# Patient Record
Sex: FEMALE | Race: WHITE | NOT HISPANIC OR LATINO | Employment: FULL TIME | URBAN - METROPOLITAN AREA
[De-identification: names, ages, dates, MRNs, and addresses within clinical notes are randomized per-mention and may not be internally consistent; named-entity substitution may affect disease eponyms.]

---

## 2024-01-12 ENCOUNTER — APPOINTMENT (OUTPATIENT)
Dept: PRIMARY CARE | Facility: CLINIC | Age: 29
End: 2024-01-12
Payer: COMMERCIAL

## 2024-02-16 ENCOUNTER — APPOINTMENT (OUTPATIENT)
Dept: OBSTETRICS AND GYNECOLOGY | Facility: CLINIC | Age: 29
End: 2024-02-16
Payer: COMMERCIAL

## 2024-02-16 ENCOUNTER — OFFICE VISIT (OUTPATIENT)
Dept: OBSTETRICS AND GYNECOLOGY | Facility: CLINIC | Age: 29
End: 2024-02-16
Payer: COMMERCIAL

## 2024-02-16 ENCOUNTER — LAB (OUTPATIENT)
Dept: LAB | Facility: LAB | Age: 29
End: 2024-02-16
Payer: COMMERCIAL

## 2024-02-16 VITALS
WEIGHT: 210 LBS | DIASTOLIC BLOOD PRESSURE: 70 MMHG | BODY MASS INDEX: 35.85 KG/M2 | SYSTOLIC BLOOD PRESSURE: 118 MMHG | HEIGHT: 64 IN

## 2024-02-16 DIAGNOSIS — Z11.3 ROUTINE SCREENING FOR STI (SEXUALLY TRANSMITTED INFECTION): ICD-10-CM

## 2024-02-16 DIAGNOSIS — Z12.4 SCREENING FOR CERVICAL CANCER: ICD-10-CM

## 2024-02-16 DIAGNOSIS — Z01.419 ENCOUNTER FOR ANNUAL ROUTINE GYNECOLOGICAL EXAMINATION: Primary | ICD-10-CM

## 2024-02-16 LAB
HBV SURFACE AG SERPL QL IA: NONREACTIVE
HCV AB SER QL: NONREACTIVE
HIV 1+2 AB+HIV1 P24 AG SERPL QL IA: NONREACTIVE
TREPONEMA PALLIDUM IGG+IGM AB [PRESENCE] IN SERUM OR PLASMA BY IMMUNOASSAY: NONREACTIVE

## 2024-02-16 PROCEDURE — 87661 TRICHOMONAS VAGINALIS AMPLIF: CPT

## 2024-02-16 PROCEDURE — 86803 HEPATITIS C AB TEST: CPT

## 2024-02-16 PROCEDURE — 88175 CYTOPATH C/V AUTO FLUID REDO: CPT

## 2024-02-16 PROCEDURE — 36415 COLL VENOUS BLD VENIPUNCTURE: CPT

## 2024-02-16 PROCEDURE — 87389 HIV-1 AG W/HIV-1&-2 AB AG IA: CPT

## 2024-02-16 PROCEDURE — 99385 PREV VISIT NEW AGE 18-39: CPT | Performed by: OBSTETRICS & GYNECOLOGY

## 2024-02-16 PROCEDURE — 87340 HEPATITIS B SURFACE AG IA: CPT

## 2024-02-16 PROCEDURE — 87800 DETECT AGNT MULT DNA DIREC: CPT

## 2024-02-16 PROCEDURE — 86780 TREPONEMA PALLIDUM: CPT

## 2024-02-20 LAB
C TRACH RRNA SPEC QL NAA+PROBE: NEGATIVE
N GONORRHOEA DNA SPEC QL PROBE+SIG AMP: NEGATIVE
T VAGINALIS RRNA SPEC QL NAA+PROBE: NEGATIVE

## 2024-02-23 ENCOUNTER — OFFICE VISIT (OUTPATIENT)
Dept: PRIMARY CARE | Facility: CLINIC | Age: 29
End: 2024-02-23
Payer: COMMERCIAL

## 2024-02-23 ENCOUNTER — LAB (OUTPATIENT)
Dept: LAB | Facility: LAB | Age: 29
End: 2024-02-23
Payer: COMMERCIAL

## 2024-02-23 VITALS
HEART RATE: 64 BPM | SYSTOLIC BLOOD PRESSURE: 114 MMHG | WEIGHT: 207.5 LBS | BODY MASS INDEX: 35.42 KG/M2 | HEIGHT: 64 IN | DIASTOLIC BLOOD PRESSURE: 75 MMHG | RESPIRATION RATE: 14 BRPM | OXYGEN SATURATION: 98 % | TEMPERATURE: 98.7 F

## 2024-02-23 DIAGNOSIS — Z00.00 ROUTINE HEALTH MAINTENANCE: ICD-10-CM

## 2024-02-23 DIAGNOSIS — K58.0 IRRITABLE BOWEL SYNDROME WITH DIARRHEA: ICD-10-CM

## 2024-02-23 DIAGNOSIS — E66.09 CLASS 2 OBESITY DUE TO EXCESS CALORIES WITHOUT SERIOUS COMORBIDITY WITH BODY MASS INDEX (BMI) OF 35.0 TO 35.9 IN ADULT: ICD-10-CM

## 2024-02-23 DIAGNOSIS — R05.3 CHRONIC COUGH: Primary | ICD-10-CM

## 2024-02-23 DIAGNOSIS — F41.9 ANXIETY: ICD-10-CM

## 2024-02-23 PROBLEM — F32.A ANXIETY AND DEPRESSION: Status: RESOLVED | Noted: 2024-02-23 | Resolved: 2024-02-23

## 2024-02-23 PROCEDURE — 86003 ALLG SPEC IGE CRUDE XTRC EA: CPT

## 2024-02-23 PROCEDURE — 80053 COMPREHEN METABOLIC PANEL: CPT

## 2024-02-23 PROCEDURE — 99214 OFFICE O/P EST MOD 30 MIN: CPT | Performed by: FAMILY MEDICINE

## 2024-02-23 PROCEDURE — 36415 COLL VENOUS BLD VENIPUNCTURE: CPT

## 2024-02-23 PROCEDURE — 83036 HEMOGLOBIN GLYCOSYLATED A1C: CPT

## 2024-02-23 PROCEDURE — 80061 LIPID PANEL: CPT

## 2024-02-23 PROCEDURE — 1036F TOBACCO NON-USER: CPT | Performed by: FAMILY MEDICINE

## 2024-02-23 PROCEDURE — 3008F BODY MASS INDEX DOCD: CPT | Performed by: FAMILY MEDICINE

## 2024-02-23 PROCEDURE — 85027 COMPLETE CBC AUTOMATED: CPT

## 2024-02-23 PROCEDURE — 84443 ASSAY THYROID STIM HORMONE: CPT

## 2024-02-23 RX ORDER — IPRATROPIUM BROMIDE 21 UG/1
2 SPRAY, METERED NASAL EVERY 12 HOURS
Qty: 30 ML | Refills: 12 | Status: SHIPPED | OUTPATIENT
Start: 2024-02-23 | End: 2024-04-05 | Stop reason: WASHOUT

## 2024-02-23 RX ORDER — SERTRALINE HYDROCHLORIDE 25 MG/1
25 TABLET, FILM COATED ORAL DAILY
Qty: 30 TABLET | Refills: 1 | Status: SHIPPED | OUTPATIENT
Start: 2024-02-23 | End: 2024-04-05 | Stop reason: SDUPTHER

## 2024-02-23 ASSESSMENT — ANXIETY QUESTIONNAIRES
4. TROUBLE RELAXING: NEARLY EVERY DAY
3. WORRYING TOO MUCH ABOUT DIFFERENT THINGS: NEARLY EVERY DAY
7. FEELING AFRAID AS IF SOMETHING AWFUL MIGHT HAPPEN: SEVERAL DAYS
5. BEING SO RESTLESS THAT IT IS HARD TO SIT STILL: MORE THAN HALF THE DAYS
6. BECOMING EASILY ANNOYED OR IRRITABLE: SEVERAL DAYS
IF YOU CHECKED OFF ANY PROBLEMS ON THIS QUESTIONNAIRE, HOW DIFFICULT HAVE THESE PROBLEMS MADE IT FOR YOU TO DO YOUR WORK, TAKE CARE OF THINGS AT HOME, OR GET ALONG WITH OTHER PEOPLE: SOMEWHAT DIFFICULT
GAD7 TOTAL SCORE: 16
1. FEELING NERVOUS, ANXIOUS, OR ON EDGE: NEARLY EVERY DAY
2. NOT BEING ABLE TO STOP OR CONTROL WORRYING: NEARLY EVERY DAY

## 2024-02-23 ASSESSMENT — PATIENT HEALTH QUESTIONNAIRE - PHQ9
9. THOUGHTS THAT YOU WOULD BE BETTER OFF DEAD, OR OF HURTING YOURSELF: NOT AT ALL
5. POOR APPETITE OR OVEREATING: MORE THAN HALF THE DAYS
6. FEELING BAD ABOUT YOURSELF - OR THAT YOU ARE A FAILURE OR HAVE LET YOURSELF OR YOUR FAMILY DOWN: SEVERAL DAYS
SUM OF ALL RESPONSES TO PHQ9 QUESTIONS 1 AND 2: 3
2. FEELING DOWN, DEPRESSED OR HOPELESS: MORE THAN HALF THE DAYS
1. LITTLE INTEREST OR PLEASURE IN DOING THINGS: SEVERAL DAYS
4. FEELING TIRED OR HAVING LITTLE ENERGY: MORE THAN HALF THE DAYS
8. MOVING OR SPEAKING SO SLOWLY THAT OTHER PEOPLE COULD HAVE NOTICED. OR THE OPPOSITE, BEING SO FIGETY OR RESTLESS THAT YOU HAVE BEEN MOVING AROUND A LOT MORE THAN USUAL: NOT AT ALL
3. TROUBLE FALLING OR STAYING ASLEEP OR SLEEPING TOO MUCH: NEARLY EVERY DAY
10. IF YOU CHECKED OFF ANY PROBLEMS, HOW DIFFICULT HAVE THESE PROBLEMS MADE IT FOR YOU TO DO YOUR WORK, TAKE CARE OF THINGS AT HOME, OR GET ALONG WITH OTHER PEOPLE: SOMEWHAT DIFFICULT
7. TROUBLE CONCENTRATING ON THINGS, SUCH AS READING THE NEWSPAPER OR WATCHING TELEVISION: MORE THAN HALF THE DAYS
SUM OF ALL RESPONSES TO PHQ QUESTIONS 1-9: 13

## 2024-02-23 NOTE — PROGRESS NOTES
"Subjective   Patient ID: Flaquita Mays is a 28 y.o. female who presents for Cough and Irritable Bowel Syndrome.    HPI   Anxiety/Depression  PHQ-9: 13  ROSA-7: 16  Used to take Lexapro in the past, uncertain it improved her IBS, lexapro made her apathetic.  Depression is not significant per patient currently, but anxiety is high due to job  No current counseling or in past  Denies SI  Sleeping not well,issues falling asleep      IBS-D  X chronic  Triggers-dairy  Never seen GI  Normally has a BM 1-2x on a good day, 6x on a bad day, does have formed stools  Never taken RX meds, but did try OTC fiber, probiotic. Not taking anything currently.  No blood in stools or weight loss  Diet-eats out 2x/week  +stress  Work-    URI  X 2-3 months  Never felt sick  Cough is productive at times  No h/o asthma  +acid reflux  Improved recently  Unknown allergies   Worse with hiking at times  +chronic nasal congestion    Review of Systems  See HPI    Objective   /75 (BP Location: Right arm, Patient Position: Sitting, BP Cuff Size: Large adult)   Pulse 64   Temp 37.1 °C (98.7 °F) (Temporal)   Resp 14   Ht 1.626 m (5' 4\")   Wt 94.1 kg (207 lb 8 oz)   LMP 02/16/2024   SpO2 98%   BMI 35.62 kg/m²     Physical Exam  Constitutional: Well developed, well nourished, alert and in no acute distress   Eyes: Normal external exam.   Neck: Supple, no lymphadenopathy or masses. No thyromegaly.   Cardiovascular: Regular rate and rhythm, normal S1 and S2, no murmurs, gallops, or rubs. Radial pulses normal. No peripheral edema.  Pulmonary: No respiratory distress, lungs clear to auscultation bilaterally. No wheezes, rhonchi, rales.  Abdomen: +BS, soft, NT, ND  Skin: Warm, well perfused, normal skin turgor and color.   Neurologic: Cranial nerves II-XII grossly intact.   Psychiatric: Mood calm and affect normal.    Assessment/Plan   Trial IBGuard OTC as directed x 1-2 weeks     Psychologist & Psychiatrist  [facility] " Psychiatry Adult [phone]  [facility] Pediatric [phone]    [facility]   [email or web address]  [address]  [place] [address]  [phone]     [facility]  [address], [place] [address]  [place] [address]  [phone]    [address]  [place], [place] [address]  [phone]    [facility]  [email or web address]  [address]  [place] [address]  Phone [phone]    [facility]  [email or web address]  [address] [place], [place] [address]   Phone [phone]    [facility]  [address], [place] [address]  [phone]     [facility]   [email or web address]   [address] [place] [address]  [phone]    [facility]  [address] [place] [address], [facility] [address], [place] [address]  [phone]    [facility]   [address] [place] [address]  [phone]    [facility]  <[email or web address]  [address]   Phone [phone]    [facility]  [email or web address]  [address] [place] [address]  [phone]    [facility]  <[email or web address]   [address], [place] [address]  Phone: [phone]    P[facility] and Psychiatry   [facility]   [address]   [place] [address]  Phone: [phone]    Timothy Perez MD  Child and Adolescent Psychiatry  Adult Psychiatry  Telephone: [phone] [address] [place] [address]    START sertraline 25mg, take 1/2 tablet with food in the morning the first 7 days, then may  increase to 1 tablet if tolerating well.    START Atrovent nasal spray as directed x 2-4 weeks, may reduce to 1 spray in each nostril 1-2x/day if too drying.     Labs ordered     Follow up in 4-6 weeks for mood and cough

## 2024-02-24 LAB
ALBUMIN SERPL BCP-MCNC: 4.3 G/DL (ref 3.4–5)
ALP SERPL-CCNC: 75 U/L (ref 33–110)
ALT SERPL W P-5'-P-CCNC: 20 U/L (ref 7–45)
ANION GAP SERPL CALC-SCNC: 13 MMOL/L (ref 10–20)
AST SERPL W P-5'-P-CCNC: 20 U/L (ref 9–39)
BILIRUB SERPL-MCNC: 0.4 MG/DL (ref 0–1.2)
BUN SERPL-MCNC: 9 MG/DL (ref 6–23)
CALCIUM SERPL-MCNC: 9.3 MG/DL (ref 8.6–10.6)
CHLORIDE SERPL-SCNC: 108 MMOL/L (ref 98–107)
CHOLEST SERPL-MCNC: 144 MG/DL (ref 0–199)
CHOLESTEROL/HDL RATIO: 3.3
CLAM IGE QN: <0.1 KU/L
CO2 SERPL-SCNC: 24 MMOL/L (ref 21–32)
CODFISH IGE QN: <0.1 KU/L
CORN IGE QN: <0.1
CREAT SERPL-MCNC: 0.77 MG/DL (ref 0.5–1.05)
EGFRCR SERPLBLD CKD-EPI 2021: >90 ML/MIN/1.73M*2
EGG WHITE IGE QN: <0.1 KU/L
ERYTHROCYTE [DISTWIDTH] IN BLOOD BY AUTOMATED COUNT: 12.4 % (ref 11.5–14.5)
EST. AVERAGE GLUCOSE BLD GHB EST-MCNC: 97 MG/DL
GLUCOSE SERPL-MCNC: 81 MG/DL (ref 74–99)
HBA1C MFR BLD: 5 %
HCT VFR BLD AUTO: 40.9 % (ref 36–46)
HDLC SERPL-MCNC: 43.4 MG/DL
HGB BLD-MCNC: 13.8 G/DL (ref 12–16)
LDLC SERPL CALC-MCNC: 87 MG/DL
MCH RBC QN AUTO: 30.6 PG (ref 26–34)
MCHC RBC AUTO-ENTMCNC: 33.7 G/DL (ref 32–36)
MCV RBC AUTO: 91 FL (ref 80–100)
MILK IGE QN: <0.1 KU/L
NON HDL CHOLESTEROL: 101 MG/DL (ref 0–149)
NRBC BLD-RTO: 0 /100 WBCS (ref 0–0)
PEANUT IGE QN: <0.1 KU/L
PLATELET # BLD AUTO: 205 X10*3/UL (ref 150–450)
POTASSIUM SERPL-SCNC: 4.2 MMOL/L (ref 3.5–5.3)
PROT SERPL-MCNC: 7.2 G/DL (ref 6.4–8.2)
RBC # BLD AUTO: 4.51 X10*6/UL (ref 4–5.2)
SCALLOP IGE QN: <0.1 KU/L
SESAME SEED IGE QN: 0.22 KU/L
SHRIMP IGE QN: <0.1 KU/L
SODIUM SERPL-SCNC: 141 MMOL/L (ref 136–145)
SOYBEAN IGE QN: <0.1 KU/L
TRIGL SERPL-MCNC: 70 MG/DL (ref 0–149)
TSH SERPL-ACNC: 1.93 MIU/L (ref 0.44–3.98)
VLDL: 14 MG/DL (ref 0–40)
WALNUT IGE QN: <0.1 KU/L
WBC # BLD AUTO: 5.3 X10*3/UL (ref 4.4–11.3)
WHEAT IGE QN: <0.1 KU/L

## 2024-02-28 LAB
CYTOLOGY CMNT CVX/VAG CYTO-IMP: NORMAL
LAB AP HPV GENOTYPE QUESTION: NO
LAB AP HPV HR: NORMAL
LAB AP PAP ADDITIONAL TESTS: NORMAL
LABORATORY COMMENT REPORT: NORMAL
LMP START DATE: NORMAL
PATH REPORT.TOTAL CANCER: NORMAL

## 2024-04-05 ENCOUNTER — OFFICE VISIT (OUTPATIENT)
Dept: PRIMARY CARE | Facility: CLINIC | Age: 29
End: 2024-04-05
Payer: COMMERCIAL

## 2024-04-05 VITALS
OXYGEN SATURATION: 97 % | BODY MASS INDEX: 35.63 KG/M2 | WEIGHT: 207.6 LBS | HEART RATE: 68 BPM | RESPIRATION RATE: 12 BRPM | TEMPERATURE: 96.3 F | DIASTOLIC BLOOD PRESSURE: 73 MMHG | SYSTOLIC BLOOD PRESSURE: 109 MMHG

## 2024-04-05 DIAGNOSIS — R05.3 CHRONIC COUGH: Chronic | ICD-10-CM

## 2024-04-05 DIAGNOSIS — F41.9 ANXIETY: Primary | Chronic | ICD-10-CM

## 2024-04-05 PROCEDURE — 3008F BODY MASS INDEX DOCD: CPT | Performed by: FAMILY MEDICINE

## 2024-04-05 PROCEDURE — 99213 OFFICE O/P EST LOW 20 MIN: CPT | Performed by: FAMILY MEDICINE

## 2024-04-05 PROCEDURE — 1036F TOBACCO NON-USER: CPT | Performed by: FAMILY MEDICINE

## 2024-04-05 RX ORDER — OMEPRAZOLE 40 MG/1
40 CAPSULE, DELAYED RELEASE ORAL
Qty: 30 CAPSULE | Refills: 0 | Status: SHIPPED | OUTPATIENT
Start: 2024-04-05 | End: 2024-05-05

## 2024-04-05 RX ORDER — SERTRALINE HYDROCHLORIDE 25 MG/1
25 TABLET, FILM COATED ORAL DAILY
Qty: 90 TABLET | Refills: 1 | Status: SHIPPED | OUTPATIENT
Start: 2024-04-05 | End: 2024-10-02

## 2024-04-05 ASSESSMENT — PATIENT HEALTH QUESTIONNAIRE - PHQ9
4. FEELING TIRED OR HAVING LITTLE ENERGY: SEVERAL DAYS
SUM OF ALL RESPONSES TO PHQ QUESTIONS 1-9: 7
2. FEELING DOWN, DEPRESSED OR HOPELESS: NOT AT ALL
1. LITTLE INTEREST OR PLEASURE IN DOING THINGS: SEVERAL DAYS
3. TROUBLE FALLING OR STAYING ASLEEP OR SLEEPING TOO MUCH: MORE THAN HALF THE DAYS
5. POOR APPETITE OR OVEREATING: SEVERAL DAYS
SUM OF ALL RESPONSES TO PHQ9 QUESTIONS 1 AND 2: 1
9. THOUGHTS THAT YOU WOULD BE BETTER OFF DEAD, OR OF HURTING YOURSELF: NOT AT ALL
10. IF YOU CHECKED OFF ANY PROBLEMS, HOW DIFFICULT HAVE THESE PROBLEMS MADE IT FOR YOU TO DO YOUR WORK, TAKE CARE OF THINGS AT HOME, OR GET ALONG WITH OTHER PEOPLE: SOMEWHAT DIFFICULT
8. MOVING OR SPEAKING SO SLOWLY THAT OTHER PEOPLE COULD HAVE NOTICED. OR THE OPPOSITE, BEING SO FIGETY OR RESTLESS THAT YOU HAVE BEEN MOVING AROUND A LOT MORE THAN USUAL: NOT AT ALL
7. TROUBLE CONCENTRATING ON THINGS, SUCH AS READING THE NEWSPAPER OR WATCHING TELEVISION: MORE THAN HALF THE DAYS
6. FEELING BAD ABOUT YOURSELF - OR THAT YOU ARE A FAILURE OR HAVE LET YOURSELF OR YOUR FAMILY DOWN: NOT AT ALL

## 2024-04-05 ASSESSMENT — ANXIETY QUESTIONNAIRES
1. FEELING NERVOUS, ANXIOUS, OR ON EDGE: MORE THAN HALF THE DAYS
6. BECOMING EASILY ANNOYED OR IRRITABLE: MORE THAN HALF THE DAYS
4. TROUBLE RELAXING: SEVERAL DAYS
5. BEING SO RESTLESS THAT IT IS HARD TO SIT STILL: NOT AT ALL
GAD7 TOTAL SCORE: 7
3. WORRYING TOO MUCH ABOUT DIFFERENT THINGS: SEVERAL DAYS
IF YOU CHECKED OFF ANY PROBLEMS ON THIS QUESTIONNAIRE, HOW DIFFICULT HAVE THESE PROBLEMS MADE IT FOR YOU TO DO YOUR WORK, TAKE CARE OF THINGS AT HOME, OR GET ALONG WITH OTHER PEOPLE: SOMEWHAT DIFFICULT
7. FEELING AFRAID AS IF SOMETHING AWFUL MIGHT HAPPEN: NOT AT ALL
2. NOT BEING ABLE TO STOP OR CONTROL WORRYING: SEVERAL DAYS

## 2024-04-05 ASSESSMENT — ENCOUNTER SYMPTOMS
WHEEZING: 0
SHORTNESS OF BREATH: 0
COUGH: 1
NERVOUS/ANXIOUS: 1

## 2024-04-05 NOTE — PROGRESS NOTES
Subjective   Patient ID: Flaquita Mays is a 28 y.o. female who presents for follow up on mood and cough.     HPI   Anxiety  PHQ-9: 7 (13)  ROSA-7: 7 (16)  Taking sertraline 25mg, and is tolerating it well but does of intermittent mild nausea at times if not taken it with food  +migraines-uncertain due to stress or weather  Started going to [facility]  Reports normal appetite  Denies apathy       Persistent cough  Trialed atrovent nasal spray x 2 weeks without improvement in cough  She is coughing throughout the day, worse yesterday  Uncertain if seasonal allergies or weather changes  Reports wheezing only when active  No h/o asthma, but brother did  Denies acid reflux  Has not tried OTC medications     Review of Systems   Respiratory:  Positive for cough. Negative for shortness of breath and wheezing.    Psychiatric/Behavioral:  The patient is nervous/anxious.        Objective   /73 (BP Location: Right arm, Patient Position: Sitting, BP Cuff Size: Large adult)   Pulse 68   Temp 35.7 °C (96.3 °F) (Temporal)   Resp 12   Wt 94.2 kg (207 lb 9.6 oz)   LMP 03/21/2024   SpO2 97%   BMI 35.63 kg/m²     Physical Exam  Constitutional: Well developed, well nourished, alert and in no acute distress.  Head and Face: NC/AT  Eyes: Normal external exam.   ENT: External inspection of ears normal, tympanic membranes visualized and normal. Nasal mucosa and turbinates swollen and erythematous, clear nasal discharge present. Oral mucosa moist, oropharynx clear without tonsillar exudate or erythema.   Neck: Supple. No cervical lymphadenopathy   Cardiovascular: Regular rate and rhythm, normal S1 and S2, no murmurs, gallops, or rubs.   Pulmonary: No respiratory distress, lungs clear to auscultation bilaterally. No wheezes, rhonchi, rales.  Skin: Warm, well perfused, normal skin turgor and color.   Neurologic: Cranial nerves II-XII grossly intact.    Assessment/Plan   GERD (Gastro-Esophageal Reflux Disease, or chronic  heartburn) plan:     Take prescription medication if we advised.    Avoid citrus, tomatoes, alcohol, caffeine, chocolate, onions, garlic, salt, peppermint, spicy foods, fried foods   Avoid large meals   Avoid laying down for 3-4 hours following meals   Avoid tight clothing around the waist  Elevate head of bed 4-8 inches   Lose weight  Stop/reduce smoking   May take TUMS or Rolaids as needed.  Call or return to the office if your symptoms change,  worsen, or fail to improve.  It may take 2 weeks for the medication to take effect.    Continue zoloft at 25mg and counseling    Follow up in 6mo

## 2024-10-31 ASSESSMENT — ENCOUNTER SYMPTOMS: NERVOUS/ANXIOUS: 1

## 2024-11-01 ENCOUNTER — APPOINTMENT (OUTPATIENT)
Dept: PRIMARY CARE | Facility: CLINIC | Age: 29
End: 2024-11-01
Payer: COMMERCIAL

## 2024-11-01 VITALS
WEIGHT: 214.5 LBS | OXYGEN SATURATION: 98 % | SYSTOLIC BLOOD PRESSURE: 134 MMHG | TEMPERATURE: 97.5 F | RESPIRATION RATE: 14 BRPM | BODY MASS INDEX: 36.82 KG/M2 | DIASTOLIC BLOOD PRESSURE: 58 MMHG | HEART RATE: 66 BPM

## 2024-11-01 DIAGNOSIS — E66.812 CLASS 2 OBESITY DUE TO EXCESS CALORIES WITHOUT SERIOUS COMORBIDITY WITH BODY MASS INDEX (BMI) OF 36.0 TO 36.9 IN ADULT: ICD-10-CM

## 2024-11-01 DIAGNOSIS — F41.9 ANXIETY: Chronic | ICD-10-CM

## 2024-11-01 DIAGNOSIS — E66.09 CLASS 2 OBESITY DUE TO EXCESS CALORIES WITHOUT SERIOUS COMORBIDITY WITH BODY MASS INDEX (BMI) OF 36.0 TO 36.9 IN ADULT: ICD-10-CM

## 2024-11-01 DIAGNOSIS — Z00.00 ENCOUNTER FOR ADULT WELLNESS VISIT: Primary | ICD-10-CM

## 2024-11-01 PROCEDURE — 1036F TOBACCO NON-USER: CPT | Performed by: FAMILY MEDICINE

## 2024-11-01 PROCEDURE — 99395 PREV VISIT EST AGE 18-39: CPT | Performed by: FAMILY MEDICINE

## 2024-11-01 ASSESSMENT — PATIENT HEALTH QUESTIONNAIRE - PHQ9
7. TROUBLE CONCENTRATING ON THINGS, SUCH AS READING THE NEWSPAPER OR WATCHING TELEVISION: SEVERAL DAYS
2. FEELING DOWN, DEPRESSED OR HOPELESS: SEVERAL DAYS
4. FEELING TIRED OR HAVING LITTLE ENERGY: NEARLY EVERY DAY
10. IF YOU CHECKED OFF ANY PROBLEMS, HOW DIFFICULT HAVE THESE PROBLEMS MADE IT FOR YOU TO DO YOUR WORK, TAKE CARE OF THINGS AT HOME, OR GET ALONG WITH OTHER PEOPLE: NOT DIFFICULT AT ALL
9. THOUGHTS THAT YOU WOULD BE BETTER OFF DEAD, OR OF HURTING YOURSELF: NOT AT ALL
3. TROUBLE FALLING OR STAYING ASLEEP OR SLEEPING TOO MUCH: NEARLY EVERY DAY
SUM OF ALL RESPONSES TO PHQ QUESTIONS 1-9: 13
5. POOR APPETITE OR OVEREATING: NEARLY EVERY DAY
1. LITTLE INTEREST OR PLEASURE IN DOING THINGS: MORE THAN HALF THE DAYS
SUM OF ALL RESPONSES TO PHQ9 QUESTIONS 1 AND 2: 3
8. MOVING OR SPEAKING SO SLOWLY THAT OTHER PEOPLE COULD HAVE NOTICED. OR THE OPPOSITE, BEING SO FIGETY OR RESTLESS THAT YOU HAVE BEEN MOVING AROUND A LOT MORE THAN USUAL: NOT AT ALL
6. FEELING BAD ABOUT YOURSELF - OR THAT YOU ARE A FAILURE OR HAVE LET YOURSELF OR YOUR FAMILY DOWN: NOT AT ALL

## 2024-11-01 ASSESSMENT — ANXIETY QUESTIONNAIRES
5. BEING SO RESTLESS THAT IT IS HARD TO SIT STILL: NOT AT ALL
7. FEELING AFRAID AS IF SOMETHING AWFUL MIGHT HAPPEN: NOT AT ALL
GAD7 TOTAL SCORE: 6
4. TROUBLE RELAXING: MORE THAN HALF THE DAYS
6. BECOMING EASILY ANNOYED OR IRRITABLE: SEVERAL DAYS
1. FEELING NERVOUS, ANXIOUS, OR ON EDGE: SEVERAL DAYS
IF YOU CHECKED OFF ANY PROBLEMS ON THIS QUESTIONNAIRE, HOW DIFFICULT HAVE THESE PROBLEMS MADE IT FOR YOU TO DO YOUR WORK, TAKE CARE OF THINGS AT HOME, OR GET ALONG WITH OTHER PEOPLE: NOT DIFFICULT AT ALL
3. WORRYING TOO MUCH ABOUT DIFFERENT THINGS: SEVERAL DAYS
2. NOT BEING ABLE TO STOP OR CONTROL WORRYING: SEVERAL DAYS

## 2024-12-10 DIAGNOSIS — F41.9 ANXIETY: Chronic | ICD-10-CM

## 2024-12-10 RX ORDER — SERTRALINE HYDROCHLORIDE 25 MG/1
25 TABLET, FILM COATED ORAL DAILY
Qty: 90 TABLET | Refills: 1 | Status: SHIPPED | OUTPATIENT
Start: 2024-12-10 | End: 2025-06-08

## 2024-12-13 ENCOUNTER — APPOINTMENT (OUTPATIENT)
Dept: OBSTETRICS AND GYNECOLOGY | Facility: CLINIC | Age: 29
End: 2024-12-13
Payer: COMMERCIAL

## 2024-12-13 VITALS
WEIGHT: 213 LBS | BODY MASS INDEX: 36.37 KG/M2 | SYSTOLIC BLOOD PRESSURE: 138 MMHG | DIASTOLIC BLOOD PRESSURE: 60 MMHG | HEIGHT: 64 IN

## 2024-12-13 DIAGNOSIS — Z30.430 ENCOUNTER FOR INSERTION OF PARAGARD IUD: Primary | ICD-10-CM

## 2024-12-13 NOTE — PROGRESS NOTES
Patient ID: Flaquita Mays is a 29 y.o. female presenting for paragard IUD insertion.     IUD Insertion    Performed by: Tia Montana MD  Authorized by: Tia Montana MD    Procedure: IUD insertion    Consent obtained by patient, parent, or legal power of  - including discussion of procedure risks and benefits, patient questions answered, and patient education provided: yes    Pregnancy risk: reasonably certain the patient is not pregnant    Date/Time of Insertion:  12/13/2024 4:54 PM  Immediately prior to procedure a time out was called: yes    Pelvic exam performed: no    Speculum placed in vagina: yes    Cervix cleaned and prepped: yes    Tenaculum/Allis/Ring Forceps applied to cervix: yes    Anesthesia used: yes    Local anesthesia:  Paracervical  Local anesthetic:  Lidocaine  Anesthetic strength (%):  1  Volume (mL):  10  Uterus sound depth (cm):  7  IUD inserted without complications: yes    OSM: copper 380 square mm  Strings trimmed to (cm):  3  Patient tolerated procedure well: yes    Intended removal date: 10 years      Follow up for annual exam or sooner PRN

## 2025-01-21 ENCOUNTER — OFFICE VISIT (OUTPATIENT)
Dept: URGENT CARE | Age: 30
End: 2025-01-21
Payer: COMMERCIAL

## 2025-01-21 VITALS
WEIGHT: 210 LBS | TEMPERATURE: 97.4 F | DIASTOLIC BLOOD PRESSURE: 87 MMHG | SYSTOLIC BLOOD PRESSURE: 138 MMHG | BODY MASS INDEX: 36.05 KG/M2 | RESPIRATION RATE: 16 BRPM | HEART RATE: 82 BPM | OXYGEN SATURATION: 99 %

## 2025-01-21 DIAGNOSIS — S51.851A CAT BITE OF RIGHT FOREARM, INITIAL ENCOUNTER: ICD-10-CM
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
## 2025-01-21 DIAGNOSIS — Z02.6 ENCOUNTER RELATED TO WORKER'S COMPENSATION CLAIM: Primary | ICD-10-CM

## 2025-01-21 RX ORDER — AMOXICILLIN AND CLAVULANATE POTASSIUM 875; 125 MG/1; MG/1
875 TABLET, FILM COATED ORAL 2 TIMES DAILY
Qty: 20 TABLET | Refills: 0 | Status: SHIPPED | OUTPATIENT
Start: 2025-01-21 | End: 2025-01-31

## 2025-01-21 ASSESSMENT — ENCOUNTER SYMPTOMS
NEUROLOGICAL NEGATIVE: 1
RESPIRATORY NEGATIVE: 1
FEVER: 0
GASTROINTESTINAL NEGATIVE: 1
WOUND: 1
FATIGUE: 0
CONSTITUTIONAL NEGATIVE: 1
ADENOPATHY: 0
MUSCULOSKELETAL NEGATIVE: 1
COLOR CHANGE: 1
CARDIOVASCULAR NEGATIVE: 1
PSYCHIATRIC NEGATIVE: 1
EYES NEGATIVE: 1
CHILLS: 0

## 2025-01-22 NOTE — PROGRESS NOTES
Subjective   Patient ID: Flaquita Mays is a 29 y.o. female. They present today with a chief complaint of Animal Bite (Cat bite right wrist 130 pm today).    History of Present Illness  Workers Comp eval. Works as a . Approx 5 hours ago, she was bitten by a cat on R forearm which the teeth penetrated her sleeve and her skin while trying to take temp. She immediately washed the site with chlorhexidene. Has tried OTC meds with mild relief. Patient denies any CP, SOB, HA, fever, abdominal pain, and nausea/vomiting otherwise.      History provided by:  Patient  Animal Bite  Associated symptoms: no fever        Past Medical History  Allergies as of 01/21/2025 - Reviewed 01/21/2025   Allergen Reaction Noted    Lexapro [escitalopram oxalate] Other 02/23/2024       (Not in a hospital admission)       Past Medical History:   Diagnosis Date    Allergic     Anxiety     Anxiety and depression 02/23/2024    Depression     IBS (irritable bowel syndrome)        Past Surgical History:   Procedure Laterality Date    WISDOM TOOTH EXTRACTION          reports that she has never smoked. She has never been exposed to tobacco smoke. She has never used smokeless tobacco. She reports current alcohol use. She reports that she does not use drugs.    Review of Systems  Review of Systems   Constitutional: Negative.  Negative for chills, fatigue and fever.   HENT: Negative.     Eyes: Negative.    Respiratory: Negative.     Cardiovascular: Negative.    Gastrointestinal: Negative.    Musculoskeletal: Negative.    Skin:  Positive for color change and wound.   Neurological: Negative.    Hematological:  Negative for adenopathy.   Psychiatric/Behavioral: Negative.                                    Objective    Vitals:    01/21/25 1844   BP: 138/87   Pulse: 82   Resp: 16   Temp: 36.3 °C (97.4 °F)   SpO2: 99%   Weight: 95.3 kg (210 lb)     No LMP recorded.    Physical Exam  Vitals and nursing note reviewed.   Constitutional:       Appearance:  Normal appearance.   HENT:      Head: Normocephalic and atraumatic.      Mouth/Throat:      Mouth: Mucous membranes are moist.      Pharynx: Oropharynx is clear.   Eyes:      Extraocular Movements: Extraocular movements intact.      Pupils: Pupils are equal, round, and reactive to light.   Cardiovascular:      Rate and Rhythm: Normal rate and regular rhythm.      Pulses: Normal pulses.      Heart sounds: Normal heart sounds.   Pulmonary:      Effort: Pulmonary effort is normal.      Breath sounds: Normal breath sounds.   Abdominal:      General: Abdomen is flat. Bowel sounds are normal.      Palpations: Abdomen is soft.   Musculoskeletal:      Left forearm: Normal.   Skin:     General: Skin is warm and dry.      Findings: Erythema, signs of injury and wound present. No abscess, bruising, ecchymosis, laceration or lesion.             Comments: Two puncture site on R forearm at marked site. Mild erythema at site. Neg for bleeding, oozing or ecchymosis at the site.   Neurological:      Mental Status: She is alert and oriented to person, place, and time.   Psychiatric:         Mood and Affect: Mood normal.         Behavior: Behavior normal.         Procedures    Point of Care Test & Imaging Results from this visit  No results found for this visit on 01/21/25.   No results found.    Diagnostic study results (if any) were reviewed by MAHAD Pugh.    Assessment/Plan   Allergies, medications, history, and pertinent labs/EKGs/Imaging reviewed by MAHAD Pugh.     Medical Decision Making  FROI and MEDCO14 completed. See scanned [facility] documents.   D/t cat puncture bite to R forearm with send Augmentin. See orders. No restrictions. Close follow up with PCP. ED for eval with any worsening s/s of infection. Discussed OTC symptoms management. Patient verbalized understanding and agreed with the plan of care.      At time of discharge, patient was clinically well-appearing and appropriate for outpatient  management. The patient/parent/guardian was educated regarding diagnosis, supportive care, OTC and Rx medications. The patient/parent/guardian was given the opportunity to ask questions prior to discharge. They verbalized understanding of discussion of treatment plan, expected course of illness and/or injury, indications on when to return to , when to seek further evaluation in ED/call 911, and the need to follow up with PCP and/or specialist as referred. Patient/parent/guardian was provided with work/school documentation if requested. Patient stable upon discharge.      Orders and Diagnoses  Diagnoses and all orders for this visit:  Encounter related to worker's compensation claim  Cat bite of right forearm, initial encounter  -     amoxicillin-pot clavulanate (Augmentin) 875-125 mg tablet; Take 1 tablet (875 mg) by mouth 2 times a day for 10 days.      Medical Admin Record      Patient disposition: Home    Electronically signed by MAHAD Pugh  9:40 PM

## 2025-01-31 ENCOUNTER — APPOINTMENT (OUTPATIENT)
Dept: OBSTETRICS AND GYNECOLOGY | Facility: CLINIC | Age: 30
End: 2025-01-31
Payer: COMMERCIAL

## 2025-02-06 ENCOUNTER — OFFICE VISIT (OUTPATIENT)
Dept: URGENT CARE | Age: 30
End: 2025-02-06
Payer: COMMERCIAL

## 2025-02-06 ENCOUNTER — ANCILLARY PROCEDURE (OUTPATIENT)
Dept: URGENT CARE | Age: 30
End: 2025-02-06
Payer: COMMERCIAL

## 2025-02-06 VITALS
OXYGEN SATURATION: 98 % | HEIGHT: 65 IN | RESPIRATION RATE: 20 BRPM | WEIGHT: 210 LBS | SYSTOLIC BLOOD PRESSURE: 130 MMHG | DIASTOLIC BLOOD PRESSURE: 74 MMHG | BODY MASS INDEX: 34.99 KG/M2 | HEART RATE: 67 BPM | TEMPERATURE: 97.5 F
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
## 2025-02-06 DIAGNOSIS — Z02.6 ENCOUNTER RELATED TO WORKER'S COMPENSATION CLAIM: ICD-10-CM

## 2025-02-06 RX ORDER — AMOXICILLIN AND CLAVULANATE POTASSIUM 875; 125 MG/1; MG/1
875 TABLET, FILM COATED ORAL 2 TIMES DAILY
Qty: 28 TABLET | Refills: 0 | Status: SHIPPED | OUTPATIENT
Start: 2025-02-06 | End: 2025-02-20

## 2025-02-06 RX ORDER — KETOROLAC TROMETHAMINE 30 MG/ML
30 INJECTION, SOLUTION INTRAMUSCULAR; INTRAVENOUS ONCE
Status: COMPLETED | OUTPATIENT
Start: 2025-02-06 | End: 2025-02-06

## 2025-02-06 RX ADMIN — KETOROLAC TROMETHAMINE 30 MG: 30 INJECTION, SOLUTION INTRAMUSCULAR; INTRAVENOUS at 15:55

## 2025-02-06 ASSESSMENT — ENCOUNTER SYMPTOMS
COLOR CHANGE: 0
JOINT SWELLING: 0
LIGHT-HEADEDNESS: 0
NUMBNESS: 0
DIZZINESS: 0
FEVER: 0
HEADACHES: 0
WOUND: 1
CHILLS: 0

## 2025-02-06 ASSESSMENT — PAIN SCALES - GENERAL: PAINLEVEL_OUTOF10: 7

## 2025-02-06 NOTE — PATIENT INSTRUCTIONS
Wound Care:  Keep the wound clean: Wash the area gently with soap and warm water at least twice daily.  Cover with a clean, dry bandage. Change the dressing daily or if it becomes wet/soiled.  Monitor for infection: Watch for increasing redness, warmth, swelling, pus drainage, or worsening pain.  Pain & Swelling Management:  Elevate the hand to reduce swelling.  Apply ice packs (wrapped in a towel) for 15-20 minutes every 2-3 hours as needed for swelling.  Take pain relievers such as OTC ibuprofen (Advil, Motrin) or acetaminophen (Tylenol) as directed.  Activity Restrictions:  Limit hand use as much as possible to prevent further irritation and promote healing.  Avoid heavy lifting or strenuous activities with the affected hand until swelling and pain improve.  Follow-Up:  Follow up with Occupational Health as instructed for wound monitoring and clearance for work.  If wound closure (stitches) is needed, follow up as scheduled for evaluation.  Check tetanus status: If you have not had a tetanus shot in the last 5 years, you may need a booster.  When to Seek Medical Attention:  Signs of infection: Increasing redness, swelling, warmth, pus, or fever (>100.4°F).  Worsening pain or decreased range of motion.  Numbness, tingling, or weakness in the fingers.  Red streaks spreading from the wound (signs of serious infection).  - - - [facility] WORK INJURY FOLLOWUP - - -    A follow-up appointment is mandatory for this [facility] claim. Please call [phone] as soon as possible to schedule an appointment at one of our [facility] Occupational Health clinics. Failure to follow up may result in delays returning to work or receiving necessary medical care. This urgent care facility is only able to care for work injuries on the first visit, and you must go to an Occupational Health clinic for your follow-up.  0

## 2025-02-06 NOTE — PROGRESS NOTES
Subjective   Patient ID: Flaquita Mays is a 29 y.o. female. They present today with a chief complaint of Animal Bite.    History of Present Illness  29 year old female presents with complaint of dog bite to her right hand. This is a workman's Comp injury. 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 Pain currently 7/10. Denies numbness, tingling, weakness, redness, warmth, radiating pain. Last Tdap was in 2022. Pt is right hand dominant.       Animal Bite  Contact animal:  Dog  Location:  Hand  Hand injury location:  R palm and dorsum of R hand  Time since incident:  45 minutes  Pain details:     Quality:  Aching and localized    Severity:  Moderate    Timing:  Constant    Progression:  Worsening  Incident location:  Work  Provoked: unprovoked    Notifications:  Health department (animal bite form completed)  Animal in possession: yes    Tetanus status:  Up to date  Relieved by:  Nothing  Worsened by:  Activity  Ineffective treatments:  None tried  Associated symptoms: swelling    Associated symptoms: no fever, no numbness and no rash        Past Medical History  Allergies as of 02/06/2025 - Reviewed 02/06/2025   Allergen Reaction Noted    Lexapro [escitalopram oxalate] Other 02/23/2024       (Not in a hospital admission)       Past Medical History:   Diagnosis Date    Allergic     Anxiety     Anxiety and depression 02/23/2024    Depression     IBS (irritable bowel syndrome)        Past Surgical History:   Procedure Laterality Date    WISDOM TOOTH EXTRACTION          reports that she has never smoked. She has never been exposed to tobacco smoke. She has never used smokeless tobacco. She reports current alcohol use. She reports that she does not use drugs.    Review of Systems  Review of Systems   Constitutional:  Negative for chills and fever.   Musculoskeletal:  Negative for joint swelling.   Skin:  Positive for wound. Negative for color change and rash.   Neurological:   "Negative for dizziness, light-headedness, numbness and headaches.   All other systems reviewed and are negative.            Objective    Vitals:    02/06/25 1537   BP: 130/74   BP Location: Left arm   Patient Position: Sitting   BP Cuff Size: Large adult   Pulse: 67   Resp: 20   Temp: 36.4 °C (97.5 °F)   TempSrc: Oral   SpO2: 98%   Weight: 95.3 kg (210 lb)   Height: 1.651 m (5' 5\")     Patient's last menstrual period was 01/23/2025 (approximate).    Physical Exam  Vitals and nursing note reviewed.   Constitutional:       Appearance: Normal appearance. She is normal weight.   Eyes:      Pupils: Pupils are equal, round, and reactive to light.   Cardiovascular:      Rate and Rhythm: Normal rate and regular rhythm.      Pulses: Normal pulses.      Heart sounds: Normal heart sounds.   Pulmonary:      Effort: Pulmonary effort is normal.      Breath sounds: Normal breath sounds.   Musculoskeletal:      Right hand: Swelling and tenderness present. No lacerations or bony tenderness. Decreased range of motion. Normal strength. Normal sensation. Normal capillary refill. Normal pulse.      Left hand: Normal.      Cervical back: Normal range of motion and neck supple.      Comments: Inspection:    Moderate swelling noted in the right hand.  Dorsal aspect: 1 cm bite santhosh present.  Palmar surface: Three 1 cm bite marks present.  No erythema or signs of infection at this time.  Range of Motion (ROM):    Limited due to pain and swelling.  Neurological:    Sensation intact to light touch in all fingers.  No evidence of nerve involvement.  Musculoskeletal:    No visible deformity.  No evidence of tendon injury; patient able to perform finger flexion and extension, though limited by pain.  No signs of osseous injury; no bony tenderness on palpation.  Vascular:    Capillary refill <2 seconds in all digits.  Radial and ulnar pulses present and equal bilaterally.   Skin:     General: Skin is warm.   Neurological:      Mental Status: She " is alert and oriented to person, place, and time.   Psychiatric:         Mood and Affect: Mood normal.         Behavior: Behavior normal.         Procedures    Point of Care Test & Imaging Results from this visit  No results found for this visit on 02/06/25.   No results found.    Diagnostic study results (if any) were reviewed by MAHAD Griffin.    Assessment/Plan   Allergies, medications, history, and pertinent labs/EKGs/Imaging reviewed by MAHAD Griffin.     Medical Decision Making  Signs and symptoms consistent with animal bite. Animal is up to date on rabies and patient is up to date on tetanus. XR negative for fracture. No signs or symptoms of cellulitis or sepsis at this time.  Patient is given prophylactic antibiotics protect against pasturella multocida. Otherwise general wound care measure provided and wound care at home discussed with patient. Return to clinic or present to ED if infection occurs or symptoms otherwise change or worsen. Otherwise follow up with PCP.  Animal encounter form completed. Patient verbalized understanding and agreeable with plan of care.  Encounter for workers comp. FROI and MEDCO14 completed. See scans.     Orders and Diagnoses  There are no diagnoses linked to this encounter.    Medical Admin Record      Patient disposition: Home    Electronically signed by MAHAD Griffin  3:42 PM

## 2025-02-14 ENCOUNTER — APPOINTMENT (OUTPATIENT)
Dept: OBSTETRICS AND GYNECOLOGY | Facility: CLINIC | Age: 30
End: 2025-02-14
Payer: COMMERCIAL

## 2025-02-14 VITALS
DIASTOLIC BLOOD PRESSURE: 78 MMHG | SYSTOLIC BLOOD PRESSURE: 112 MMHG | BODY MASS INDEX: 36.49 KG/M2 | WEIGHT: 219 LBS | HEIGHT: 65 IN

## 2025-02-14 DIAGNOSIS — Z97.5 IUD (INTRAUTERINE DEVICE) IN PLACE: ICD-10-CM

## 2025-02-14 DIAGNOSIS — Z23 NEED FOR HPV VACCINATION: ICD-10-CM

## 2025-02-14 DIAGNOSIS — Z01.419 ENCOUNTER FOR ANNUAL ROUTINE GYNECOLOGICAL EXAMINATION: Primary | ICD-10-CM

## 2025-02-14 PROCEDURE — 3008F BODY MASS INDEX DOCD: CPT | Performed by: OBSTETRICS & GYNECOLOGY

## 2025-02-14 PROCEDURE — 90471 IMMUNIZATION ADMIN: CPT | Performed by: OBSTETRICS & GYNECOLOGY

## 2025-02-14 PROCEDURE — 99395 PREV VISIT EST AGE 18-39: CPT | Performed by: OBSTETRICS & GYNECOLOGY

## 2025-02-14 PROCEDURE — 1036F TOBACCO NON-USER: CPT | Performed by: OBSTETRICS & GYNECOLOGY

## 2025-02-14 PROCEDURE — 90651 9VHPV VACCINE 2/3 DOSE IM: CPT | Performed by: OBSTETRICS & GYNECOLOGY

## 2025-02-14 ASSESSMENT — PAIN SCALES - GENERAL: PAINLEVEL_OUTOF10: 0-NO PAIN

## 2025-02-14 NOTE — PROGRESS NOTES
"Assessment     PLAN  1. Encounter for annual routine gynecological examination  - pap up to date     2. Need for HPV vaccination  - first dose given today   - HPV 9-valent vaccine (GARDASIL 9)    3. IUD (intrauterine device) in place  - happy with paragard IUD    Please return for your next visit in 1 year or sooner as needed.    Tia Montana MD      Subjective     Flaquita Mays is a 29 y.o. female who is here for a routine exam.   PCP = DO VERENICE Morrissey Concerns: None    Gynecologic History:    OBHx: G0  Menses: heavier period in January after copper IUD was inserted, duration 7-9 days  Last Pap: NILM 2/2024  HPV Vaccine: No, will get first dose today   History of Dysplasia: NA  Sexually active: not currently, active in past   STI testing: declines  Contraception: copper IUD placed 12/2024  Mammogram: NA  FamHx of GYN cancers:  Great aunt had breast cancer in 40s      PMH, PSH, FH, SH, medications and allergies reviewed and edited as needed.      Objective   /78   Ht 1.651 m (5' 5\")   Wt 99.3 kg (219 lb)   LMP 01/23/2025 (Approximate)   BMI 36.44 kg/m²      General:   Alert and oriented, in no acute distress   Neck: Supple. No visible thyromegaly.    Breast/Axilla: Normal to palpation bilaterally without masses, skin changes, or nipple discharge.    Abdomen: Soft, non-tender, without masses or organomegaly   Vulva: Normal architecture without erythema, masses, or lesions.    Vagina: Normal mucosa without lesions, masses, or atrophy. No abnormal vaginal discharge.    Cervix: Normal without masses, lesions, or signs of cervicitis. Strings visible   Uterus: Normal mobile, non-enlarged uterus    Adnexa: Normal without masses or lesions   Pelvic Floor No POP noted. No high tone pelvic floor   Psych Normal affect. Normal mood.        "

## 2025-03-07 NOTE — PROGRESS NOTES
Yair Mays. Is 29 y.o.. female. Here for follow up office visit-       No chief complaint on file.      HPI:      Dr Mohr pt      How is patient doing today?      Follow up for ED/UC/Hospital admission:  Date(s):  3/5/25    Today -   9   days post discharge           Dx:  Urinary tract infection without hematuria, site unspecified - Primary    Leukocytosis, unspecified type    Left sided abdominal pain   Abdominal pain, unspecified site    IUD (intrauterine device) in place   Presence of intrauterine contraceptive device        ED Course:        WBC 11.42 (H)     Glucose 111 (H     HCG Qualitative, Urine Negative Negative     ABNORMAL) URINALYSIS (WITH MICROSCOPIC) WITH CULTURE IF INDICATED (03/05/2025 12:46 PM EST)  Lab Results - (ABNORMAL) URINALYSIS (WITH MICROSCOPIC) WITH CULTURE IF INDICATED (03/05/2025 12:46 PM EST)  Component Value Ref Range Test Method Analysis Time Performed At Pathologist Signature   Color Brown (A) Yellow   03/05/2025 1:23 PM EST [facility]     Clarity Cloudy (A) Clear   03/05/2025 1:23 PM EST [facility]     Glucose, Urine Negative Negative   03/05/2025 1:23 PM EST [facility]     Bilirubin, Urine Negative Negative   03/05/2025 1:23 PM EST [facility]     Ketones, Urine Negative Negative   03/05/2025 1:23 PM EST [facility]     Specific Gravity, Ur >=1.030 (H) 1.005 - 1.030   03/05/2025 1:23 PM EST [facility]     Hemoglobin/Blood,Ur 3+ (A) Negative   03/05/2025 1:23 PM EST [facility]     pH, Urine 6.0 5.0 - 8.0   03/05/2025 1:23 PM EST [facility]     Protein, Urine 3+ (A) Negative   03/05/2025 1:23 PM EST [facility]     Urobilinogen 1.0 EU/dL 0.2-1.0 EU/dL   03/05/2025 1:23 PM EST [facility]     Nitrites Positive (A) Negative   03/05/2025 1:23 PM EST [facility]     Leuk Esterase 2+ (A) Negative   03/05/2025 1:23 PM EST [facility]     WBC, Urine >25 /HPF (A) 0-5 /HPF   03/05/2025 1:23 PM  EST [facility]     RBC, Urine >25 /HPF (A) 0-3 /HPF   03/05/2025 1:23 PM EST [facility]     Bacteria Many (A) None Seen /HPF   03/05/2025 1:23 PM EST [facility]     Squamous Epithelial Cells Few /HPF   03/0         No U cx done       CT Flank-   03/05/2025 4:53 PM EST   IMPRESSION:    No urinary tract calculus. No hydronephrosis.       Treated with bactrim          The patient is here for a hospital follow up.  Hospital records were reviewed prior to visit, including relevant labs, imaging findings, consultant notes, and discharge summary.  Medications were reconciled and are current, reviewed today.       No visits with results within 12 Month(s) from this visit.   Latest known visit with results is:   Lab on 02/23/2024   Component Date Value Ref Range Status    Hemoglobin A1C 02/23/2024 5.0  see below % Final    Estimated Average Glucose 02/23/2024 97  Not Established mg/dL Final    WBC 02/23/2024 5.3  4.4 - 11.3 x10*3/uL Final    nRBC 02/23/2024 0.0  0.0 - 0.0 /100 WBCs Final    RBC 02/23/2024 4.51  4.00 - 5.20 x10*6/uL Final    Hemoglobin 02/23/2024 13.8  12.0 - 16.0 g/dL Final    Hematocrit 02/23/2024 40.9  36.0 - 46.0 % Final    MCV 02/23/2024 91  80 - 100 fL Final    MCH 02/23/2024 30.6  26.0 - 34.0 pg Final    MCHC 02/23/2024 33.7  32.0 - 36.0 g/dL Final    RDW 02/23/2024 12.4  11.5 - 14.5 % Final    Platelets 02/23/2024 205  150 - 450 x10*3/uL Final    Glucose 02/23/2024 81  74 - 99 mg/dL Final    Sodium 02/23/2024 141  136 - 145 mmol/L Final    Potassium 02/23/2024 4.2  3.5 - 5.3 mmol/L Final    Chloride 02/23/2024 108 (H)  98 - 107 mmol/L Final    Bicarbonate 02/23/2024 24  21 - 32 mmol/L Final    Anion Gap 02/23/2024 13  10 - 20 mmol/L Final    Urea Nitrogen 02/23/2024 9  6 - 23 mg/dL Final    Creatinine 02/23/2024 0.77  0.50 - 1.05 mg/dL Final    eGFR 02/23/2024 >90  >60 mL/min/1.73m*2 Final    Calculations of estimated GFR are performed using the 2021 CKD-EPI Study Refit equation  without the race variable for the IDMS-Traceable creatinine methods.  https://jasn.asnjournals.org/content/early/2021/09/22/ASN.3409480822    Calcium 02/23/2024 9.3  8.6 - 10.6 mg/dL Final    Albumin 02/23/2024 4.3  3.4 - 5.0 g/dL Final    Alkaline Phosphatase 02/23/2024 75  33 - 110 U/L Final    Total Protein 02/23/2024 7.2  6.4 - 8.2 g/dL Final    AST 02/23/2024 20  9 - 39 U/L Final    Bilirubin, Total 02/23/2024 0.4  0.0 - 1.2 mg/dL Final    ALT 02/23/2024 20  7 - 45 U/L Final    Patients treated with Sulfasalazine may generate falsely decreased results for ALT.    Thyroid Stimulating Hormone 02/23/2024 1.93  0.44 - 3.98 mIU/L Final    Cholesterol 02/23/2024 144  0 - 199 mg/dL Final          Age      Desirable   Borderline High   High     0-19 Y     0 - 169       170 - 199     >/= 200    20-24 Y     0 - 189       190 - 224     >/= 225         >24 Y     0 - 199       200 - 239     >/= 240   **All ranges are based on fasting samples. Specific   therapeutic targets will vary based on patient-specific   cardiac risk.    Pediatric guidelines reference:Pediatrics 2011, 128(S5).Adult guidelines reference: NCEP ATPIII Guidelines,ALINA 2001, 258:2486-97    Venipuncture immediately after or during the administration of Metamizole may lead to falsely low results. Testing should be performed immediately prior to Metamizole dosing.    HDL-Cholesterol 02/23/2024 43.4  mg/dL Final      Age       Very Low   Low     Normal    High    0-19 Y    < 35      < 40     40-45     ----  20-24 Y    ----     < 40      >45      ----        >24 Y      ----     < 40     40-60      >60      Cholesterol/HDL Ratio 02/23/2024 3.3   Final      Ref Values  Desirable  < 3.4  High Risk  > 5.0    LDL Calculated 02/23/2024 87  <=99 mg/dL Final                                Near   Borderline      AGE      Desirable  Optimal    High     High     Very High     0-19 Y     0 - 109     ---    110-129   >/= 130     ----    20-24 Y     0 - 119     ---     120-159   >/= 160     ----      >24 Y     0 -  99   100-129  130-159   160-189     >/=190      VLDL 02/23/2024 14  0 - 40 mg/dL Final    Triglycerides 02/23/2024 70  0 - 149 mg/dL Final       Age         Desirable   Borderline High   High     Very High   0 D-90 D    19 - 174         ----         ----        ----  91 D- 9 Y     0 -  74        75 -  99     >/= 100      ----    10-19 Y     0 -  89        90 - 129     >/= 130      ----    20-24 Y     0 - 114       115 - 149     >/= 150      ----         >24 Y     0 - 149       150 - 199    200- 499    >/= 500    Venipuncture immediately after or during the administration of Metamizole may lead to falsely low results. Testing should be performed immediately prior to Metamizole dosing.    Non HDL Cholesterol 02/23/2024 101  0 - 149 mg/dL Final          Age       Desirable   Borderline High   High     Very High     0-19 Y     0 - 119       120 - 144     >/= 145    >/= 160    20-24 Y     0 - 149       150 - 189     >/= 190      ----         >24 Y    30 mg/dL above LDL Cholesterol goal      Clam IgE 02/23/2024 <0.10  <0.10 kU/L Final    Fish (Cod) IgE 02/23/2024 <0.10  <0.10 kU/L Final    [place], [place] IgE 02/23/2024 <0.10   Final    Egg White IgE 02/23/2024 <0.10  <0.10 kU/L Final    Cow's Milk IgE 02/23/2024 <0.10  <0.10 kU/L Final    Peanut IgE 02/23/2024 <0.10  <0.10 kU/L Final    Scallop IgE 02/23/2024 <0.10  <0.10 kU/L Final    Sesame Seed IgE 02/23/2024 0.22 (Equiv IgE)  <0.10 kU/L Final    Shrimp IgE 02/23/2024 <0.10  <0.10 kU/L Final    Soybean IgE 02/23/2024 <0.10  <0.10 kU/L Final    [place] IgE 02/23/2024 <0.10  <0.10 kU/L Final    Wheat IgE 02/23/2024 <0.10  <0.10 kU/L Final           Patient Active Problem List    Diagnosis Date Noted    Irritable bowel syndrome with diarrhea 02/23/2024           Reviewed ANGELITO note:    No data recorded      Patient Care Team:  Gela Mohr DO as PCP - General (Family Medicine)  Gela Mohr DO as PCP - [facility]  PCP      The following portions of the patient's chart were reviewed in this encounter and updated as appropriate:  Tobacco  Allergies  Meds  Problems  Med Hx  Surg Hx  Fam Hx     REVIEW OF SYSTEMS:        Objective      There were no vitals filed for this visit.    PHYSICAL:      Patient is alert and oriented x 3 , NAD  HEAD- normocephalic and atraumatic   EYES-conjunctiva-normal, lids - normal  EARS/NOSE- normal external exam   NECK-supple,FROM  CV- RRR without murmur  PULM- CTA bilaterally, normal respiratory effort  RESPIRATORY EFFORT- normal , no retractions or nasal flaring   ABD- normoactive BS's   EXT- no edema,NT  SKIN- no abnormal skin lesions noted  NEURO- no focal deficits  PSYCH- pleasant, normal judgement and insight      BP Readings from Last 3 Encounters:   02/14/25 112/78   02/06/25 130/74   01/21/25 138/87         Wt Readings from Last 3 Encounters:   02/14/25 99.3 kg (219 lb)   02/06/25 95.3 kg (210 lb)   01/21/25 95.3 kg (210 lb)       BMI Readings from Last 3 Encounters:   02/14/25 36.44 kg/m²   02/06/25 34.95 kg/m²   01/21/25 36.05 kg/m²           The number and complexity of problems addressed is considered moderate.  The amount and/or complexity of data reviewed and analyzed is considered moderate. The risk of complications and/or morbidity/mortality of patient is considered moderate. Overall, this patient encounter is considered a moderate risk visit.    The patient is here for a hospital follow up.  Hospital records were reviewed prior to visit, including relevant labs, imaging findings, consultant notes, and discharge summary.  Medications were reconciled and are current, reviewed today.    Time spent reviewing hospital records prior to today's face-to-face office visit=         minutes    Initial ANGELITO note reviewed.      Today's face-to-face office visit is occurring within   -         -    days of discharge.      If appropriate - referrals placed,  home health care  arranged        if needed - community resources discussed with patient/caregiver -such as  Assisted Living, Hospice, Support Groups        if appropriate- Durable Medical Equipment -     see DME orders        Additional communication delivered or planned-           Patient education provided when applicable.             Assessment/Plan      Assessment & Plan  Hospital discharge follow-up         Urinary tract infection with hematuria, site unspecified         Generalized abdominal pain         IUD (intrauterine device) in place                       Current Outpatient Medications:     sertraline (Zoloft) 25 mg tablet, Take 1 tablet (25 mg) by mouth once daily., Disp: 90 tablet, Rfl: 1      Ordered lab      Follow up in      Time spent during the office visit includes-    updating and familiarizing myself with patients past medical history, social history, and allergies :  discussing ROS ;  obtaining direct  chief complaint and history of present illness from patient ,  reviewing and reconciling current medication list - both prescription and over the counter medications    clinically examine patient    determine what testing if any is needed to  diagnose the condition/conditions  with which patient is presenting    using medical knowledge to put all of the information together and decide on best course of action to proceed with diagnosis  and  treatment for the presenting problem or problems    Pre-visit planning, chart review, and face-to-face encounter   Review of urgent care ?Hospital records (receicved before and/or after visit,  or same day)  Discussion of medications  Coordination with patient  and /or office staff  for med adjustments   Final documentation of visit        My total time spent caring for the patient for the day of the encounter (before,during, and after) was =     >30      total minutes.      (Prep+during visit+post visit)

## 2025-03-14 ENCOUNTER — APPOINTMENT (OUTPATIENT)
Dept: PRIMARY CARE | Facility: CLINIC | Age: 30
End: 2025-03-14
Payer: COMMERCIAL

## 2025-03-14 DIAGNOSIS — R31.9 URINARY TRACT INFECTION WITH HEMATURIA, SITE UNSPECIFIED: ICD-10-CM

## 2025-03-14 DIAGNOSIS — Z09 HOSPITAL DISCHARGE FOLLOW-UP: Primary | ICD-10-CM

## 2025-03-14 DIAGNOSIS — N39.0 URINARY TRACT INFECTION WITH HEMATURIA, SITE UNSPECIFIED: ICD-10-CM

## 2025-03-14 DIAGNOSIS — R10.84 GENERALIZED ABDOMINAL PAIN: ICD-10-CM

## 2025-03-14 DIAGNOSIS — Z97.5 IUD (INTRAUTERINE DEVICE) IN PLACE: ICD-10-CM

## 2025-03-14 NOTE — PROGRESS NOTES
Yair Mays. Is 29 y.o.. female. Here for follow up office visit-       No chief complaint on file.      HPI:      Dr Mohr pt      How is patient doing today?      Follow up for ED/UC/Hospital admission:  Date(s):  3/5/25    Today -   9   days post discharge           Dx:  Urinary tract infection without hematuria, site unspecified - Primary    Leukocytosis, unspecified type    Left sided abdominal pain   Abdominal pain, unspecified site    IUD (intrauterine device) in place   Presence of intrauterine contraceptive device        ED Course:        WBC 11.42 (H)     Glucose 111 (H     HCG Qualitative, Urine Negative Negative     ABNORMAL) URINALYSIS (WITH MICROSCOPIC) WITH CULTURE IF INDICATED (03/05/2025 12:46 PM EST)  Lab Results - (ABNORMAL) URINALYSIS (WITH MICROSCOPIC) WITH CULTURE IF INDICATED (03/05/2025 12:46 PM EST)  Component Value Ref Range Test Method Analysis Time Performed At Pathologist Signature   Color Brown (A) Yellow   03/05/2025 1:23 PM EST [facility]     Clarity Cloudy (A) Clear   03/05/2025 1:23 PM EST [facility]     Glucose, Urine Negative Negative   03/05/2025 1:23 PM EST [facility]     Bilirubin, Urine Negative Negative   03/05/2025 1:23 PM EST [facility]     Ketones, Urine Negative Negative   03/05/2025 1:23 PM EST [facility]     Specific Gravity, Ur >=1.030 (H) 1.005 - 1.030   03/05/2025 1:23 PM EST [facility]     Hemoglobin/Blood,Ur 3+ (A) Negative   03/05/2025 1:23 PM EST [facility]     pH, Urine 6.0 5.0 - 8.0   03/05/2025 1:23 PM EST [facility]     Protein, Urine 3+ (A) Negative   03/05/2025 1:23 PM EST [facility]     Urobilinogen 1.0 EU/dL 0.2-1.0 EU/dL   03/05/2025 1:23 PM EST [facility]     Nitrites Positive (A) Negative   03/05/2025 1:23 PM EST [facility]     Leuk Esterase 2+ (A) Negative   03/05/2025 1:23 PM EST [facility]     WBC, Urine >25 /HPF (A) 0-5 /HPF   03/05/2025 1:23 PM  EST [facility]     RBC, Urine >25 /HPF (A) 0-3 /HPF   03/05/2025 1:23 PM EST [facility]     Bacteria Many (A) None Seen /HPF   03/05/2025 1:23 PM EST [facility]     Squamous Epithelial Cells Few /HPF   03/0         No U cx done       CT Flank-   03/05/2025 4:53 PM EST   IMPRESSION:    No urinary tract calculus. No hydronephrosis.       Treated with bactrim          The patient is here for a hospital follow up.  Hospital records were reviewed prior to visit, including relevant labs, imaging findings, consultant notes, and discharge summary.  Medications were reconciled and are current, reviewed today.       No visits with results within 12 Month(s) from this visit.   Latest known visit with results is:   Lab on 02/23/2024   Component Date Value Ref Range Status    Hemoglobin A1C 02/23/2024 5.0  see below % Final    Estimated Average Glucose 02/23/2024 97  Not Established mg/dL Final    WBC 02/23/2024 5.3  4.4 - 11.3 x10*3/uL Final    nRBC 02/23/2024 0.0  0.0 - 0.0 /100 WBCs Final    RBC 02/23/2024 4.51  4.00 - 5.20 x10*6/uL Final    Hemoglobin 02/23/2024 13.8  12.0 - 16.0 g/dL Final    Hematocrit 02/23/2024 40.9  36.0 - 46.0 % Final    MCV 02/23/2024 91  80 - 100 fL Final    MCH 02/23/2024 30.6  26.0 - 34.0 pg Final    MCHC 02/23/2024 33.7  32.0 - 36.0 g/dL Final    RDW 02/23/2024 12.4  11.5 - 14.5 % Final    Platelets 02/23/2024 205  150 - 450 x10*3/uL Final    Glucose 02/23/2024 81  74 - 99 mg/dL Final    Sodium 02/23/2024 141  136 - 145 mmol/L Final    Potassium 02/23/2024 4.2  3.5 - 5.3 mmol/L Final    Chloride 02/23/2024 108 (H)  98 - 107 mmol/L Final    Bicarbonate 02/23/2024 24  21 - 32 mmol/L Final    Anion Gap 02/23/2024 13  10 - 20 mmol/L Final    Urea Nitrogen 02/23/2024 9  6 - 23 mg/dL Final    Creatinine 02/23/2024 0.77  0.50 - 1.05 mg/dL Final    eGFR 02/23/2024 >90  >60 mL/min/1.73m*2 Final    Calculations of estimated GFR are performed using the 2021 CKD-EPI Study Refit equation  without the race variable for the IDMS-Traceable creatinine methods.  https://jasn.asnjournals.org/content/early/2021/09/22/ASN.2566544417    Calcium 02/23/2024 9.3  8.6 - 10.6 mg/dL Final    Albumin 02/23/2024 4.3  3.4 - 5.0 g/dL Final    Alkaline Phosphatase 02/23/2024 75  33 - 110 U/L Final    Total Protein 02/23/2024 7.2  6.4 - 8.2 g/dL Final    AST 02/23/2024 20  9 - 39 U/L Final    Bilirubin, Total 02/23/2024 0.4  0.0 - 1.2 mg/dL Final    ALT 02/23/2024 20  7 - 45 U/L Final    Patients treated with Sulfasalazine may generate falsely decreased results for ALT.    Thyroid Stimulating Hormone 02/23/2024 1.93  0.44 - 3.98 mIU/L Final    Cholesterol 02/23/2024 144  0 - 199 mg/dL Final          Age      Desirable   Borderline High   High     0-19 Y     0 - 169       170 - 199     >/= 200    20-24 Y     0 - 189       190 - 224     >/= 225         >24 Y     0 - 199       200 - 239     >/= 240   **All ranges are based on fasting samples. Specific   therapeutic targets will vary based on patient-specific   cardiac risk.    Pediatric guidelines reference:Pediatrics 2011, 128(S5).Adult guidelines reference: NCEP ATPIII Guidelines,AL[place] 2001, 258:2486-97    Venipuncture immediately after or during the administration of Metamizole may lead to falsely low results. Testing should be performed immediately prior to Metamizole dosing.    HDL-Cholesterol 02/23/2024 43.4  mg/dL Final      Age       Very Low   Low     Normal    High    0-19 Y    < 35      < 40     40-45     ----  20-24 Y    ----     < 40      >45      ----        >24 Y      ----     < 40     40-60      >60      Cholesterol/HDL Ratio 02/23/2024 3.3   Final      Ref Values  Desirable  < 3.4  High Risk  > 5.0    LDL Calculated 02/23/2024 87  <=99 mg/dL Final                                Near   Borderline      AGE      Desirable  Optimal    High     High     Very High     0-19 Y     0 - 109     ---    110-129   >/= 130     ----    20-24 Y     0 - 119     ---     120-159   >/= 160     ----      >24 Y     0 -  99   100-129  130-159   160-189     >/=190      VLDL 02/23/2024 14  0 - 40 mg/dL Final    Triglycerides 02/23/2024 70  0 - 149 mg/dL Final       Age         Desirable   Borderline High   High     Very High   0 D-90 D    19 - 174         ----         ----        ----  91 D- 9 Y     0 -  74        75 -  99     >/= 100      ----    10-19 Y     0 -  89        90 - 129     >/= 130      ----    20-24 Y     0 - 114       115 - 149     >/= 150      ----         >24 Y     0 - 149       150 - 199    200- 499    >/= 500    Venipuncture immediately after or during the administration of Metamizole may lead to falsely low results. Testing should be performed immediately prior to Metamizole dosing.    Non HDL Cholesterol 02/23/2024 101  0 - 149 mg/dL Final          Age       Desirable   Borderline High   High     Very High     0-19 Y     0 - 119       120 - 144     >/= 145    >/= 160    20-24 Y     0 - 149       150 - 189     >/= 190      ----         >24 Y    30 mg/dL above LDL Cholesterol goal      Clam IgE 02/23/2024 <0.10  <0.10 kU/L Final    Fish (Cod) IgE 02/23/2024 <0.10  <0.10 kU/L Final    [place], [place] IgE 02/23/2024 <0.10   Final    Egg White IgE 02/23/2024 <0.10  <0.10 kU/L Final    Cow's Milk IgE 02/23/2024 <0.10  <0.10 kU/L Final    Peanut IgE 02/23/2024 <0.10  <0.10 kU/L Final    Scallop IgE 02/23/2024 <0.10  <0.10 kU/L Final    Sesame Seed IgE 02/23/2024 0.22 (Equiv IgE)  <0.10 kU/L Final    Shrimp IgE 02/23/2024 <0.10  <0.10 kU/L Final    Soybean IgE 02/23/2024 <0.10  <0.10 kU/L Final    [place] IgE 02/23/2024 <0.10  <0.10 kU/L Final    Wheat IgE 02/23/2024 <0.10  <0.10 kU/L Final           Patient Active Problem List    Diagnosis Date Noted    Irritable bowel syndrome with diarrhea 02/23/2024           Reviewed ANGELITO note:    No data recorded      Patient Care Team:  Gela Mohr DO as PCP - General (Family Medicine)  Gela Mohr DO as PCP - [facility]  PCP      The following portions of the patient's chart were reviewed in this encounter and updated as appropriate:  Tobacco  Allergies  Meds  Problems  Med Hx  Surg Hx  Fam Hx     REVIEW OF SYSTEMS:        Objective      There were no vitals filed for this visit.    PHYSICAL:      Patient is alert and oriented x 3 , NAD  HEAD- normocephalic and atraumatic   EYES-conjunctiva-normal, lids - normal  EARS/NOSE- normal external exam   NECK-supple,FROM  CV- RRR without murmur  PULM- CTA bilaterally, normal respiratory effort  RESPIRATORY EFFORT- normal , no retractions or nasal flaring   ABD- normoactive BS's   EXT- no edema,NT  SKIN- no abnormal skin lesions noted  NEURO- no focal deficits  PSYCH- pleasant, normal judgement and insight      BP Readings from Last 3 Encounters:   02/14/25 112/78   02/06/25 130/74   01/21/25 138/87         Wt Readings from Last 3 Encounters:   02/14/25 99.3 kg (219 lb)   02/06/25 95.3 kg (210 lb)   01/21/25 95.3 kg (210 lb)       BMI Readings from Last 3 Encounters:   02/14/25 36.44 kg/m²   02/06/25 34.95 kg/m²   01/21/25 36.05 kg/m²           The number and complexity of problems addressed is considered moderate.  The amount and/or complexity of data reviewed and analyzed is considered moderate. The risk of complications and/or morbidity/mortality of patient is considered moderate. Overall, this patient encounter is considered a moderate risk visit.    The patient is here for a hospital follow up.  Hospital records were reviewed prior to visit, including relevant labs, imaging findings, consultant notes, and discharge summary.  Medications were reconciled and are current, reviewed today.    Time spent reviewing hospital records prior to today's face-to-face office visit=         minutes    Initial ANGELITO note reviewed.      Today's face-to-face office visit is occurring within   -         -    days of discharge.      If appropriate - referrals placed,  home health care  arranged        if needed - community resources discussed with patient/caregiver -such as  Assisted Living, Hospice, Support Groups        if appropriate- Durable Medical Equipment -     see DME orders        Additional communication delivered or planned-           Patient education provided when applicable.             Assessment/Plan      Assessment & Plan                  Current Outpatient Medications:     sertraline (Zoloft) 25 mg tablet, Take 1 tablet (25 mg) by mouth once daily., Disp: 90 tablet, Rfl: 1      Ordered lab      Follow up in      Time spent during the office visit includes-    updating and familiarizing myself with patients past medical history, social history, and allergies :  discussing ROS ;  obtaining direct  chief complaint and history of present illness from patient ,  reviewing and reconciling current medication list - both prescription and over the counter medications    clinically examine patient    determine what testing if any is needed to  diagnose the condition/conditions  with which patient is presenting    using medical knowledge to put all of the information together and decide on best course of action to proceed with diagnosis  and  treatment for the presenting problem or problems    Pre-visit planning, chart review, and face-to-face encounter   Review of urgent care ?Hospital records (receicved before and/or after visit,  or same day)  Discussion of medications  Coordination with patient  and /or office staff  for med adjustments   Final documentation of visit        My total time spent caring for the patient for the day of the encounter (before,during, and after) was =     >30      total minutes.      (Prep+during visit+post visit)

## 2025-03-21 ENCOUNTER — APPOINTMENT (OUTPATIENT)
Dept: PRIMARY CARE | Facility: CLINIC | Age: 30
End: 2025-03-21
Payer: COMMERCIAL

## 2025-04-18 ENCOUNTER — APPOINTMENT (OUTPATIENT)
Dept: OBSTETRICS AND GYNECOLOGY | Facility: CLINIC | Age: 30
End: 2025-04-18
Payer: COMMERCIAL

## 2025-04-18 DIAGNOSIS — Z23 NEED FOR HPV VACCINATION: ICD-10-CM

## 2025-04-18 PROCEDURE — 90651 9VHPV VACCINE 2/3 DOSE IM: CPT | Performed by: OBSTETRICS & GYNECOLOGY

## 2025-04-18 PROCEDURE — 90471 IMMUNIZATION ADMIN: CPT | Performed by: OBSTETRICS & GYNECOLOGY

## 2025-04-18 NOTE — PROGRESS NOTES
Patient came in for second HPV vaccine. Patient denies issues/ side effects to this first one. Patient tolerated vaccine well. No questions or concerns.

## 2025-05-02 ENCOUNTER — APPOINTMENT (OUTPATIENT)
Dept: PRIMARY CARE | Facility: CLINIC | Age: 30
End: 2025-05-02
Payer: COMMERCIAL

## 2025-05-16 ENCOUNTER — APPOINTMENT (OUTPATIENT)
Dept: PRIMARY CARE | Facility: CLINIC | Age: 30
End: 2025-05-16
Payer: COMMERCIAL

## 2025-05-16 VITALS
TEMPERATURE: 97.4 F | RESPIRATION RATE: 14 BRPM | DIASTOLIC BLOOD PRESSURE: 73 MMHG | HEART RATE: 65 BPM | WEIGHT: 220.3 LBS | OXYGEN SATURATION: 96 % | SYSTOLIC BLOOD PRESSURE: 110 MMHG | BODY MASS INDEX: 36.66 KG/M2

## 2025-05-16 DIAGNOSIS — F33.1 MODERATE EPISODE OF RECURRENT MAJOR DEPRESSIVE DISORDER: ICD-10-CM

## 2025-05-16 DIAGNOSIS — Z00.00 ROUTINE HEALTH MAINTENANCE: ICD-10-CM

## 2025-05-16 DIAGNOSIS — F41.9 ANXIETY: Primary | Chronic | ICD-10-CM

## 2025-05-16 DIAGNOSIS — Z09 HOSPITAL DISCHARGE FOLLOW-UP: ICD-10-CM

## 2025-05-16 PROCEDURE — 99214 OFFICE O/P EST MOD 30 MIN: CPT | Performed by: FAMILY MEDICINE

## 2025-05-16 PROCEDURE — 1036F TOBACCO NON-USER: CPT | Performed by: FAMILY MEDICINE

## 2025-05-16 RX ORDER — SERTRALINE HYDROCHLORIDE 50 MG/1
50 TABLET, FILM COATED ORAL DAILY
Qty: 90 TABLET | Refills: 1 | Status: SHIPPED | OUTPATIENT
Start: 2025-05-16 | End: 2025-11-12

## 2025-05-16 ASSESSMENT — PATIENT HEALTH QUESTIONNAIRE - PHQ9
1. LITTLE INTEREST OR PLEASURE IN DOING THINGS: NEARLY EVERY DAY
7. TROUBLE CONCENTRATING ON THINGS, SUCH AS READING THE NEWSPAPER OR WATCHING TELEVISION: MORE THAN HALF THE DAYS
SUM OF ALL RESPONSES TO PHQ QUESTIONS 1-9: 17
SUM OF ALL RESPONSES TO PHQ9 QUESTIONS 1 AND 2: 6
6. FEELING BAD ABOUT YOURSELF - OR THAT YOU ARE A FAILURE OR HAVE LET YOURSELF OR YOUR FAMILY DOWN: SEVERAL DAYS
3. TROUBLE FALLING OR STAYING ASLEEP OR SLEEPING TOO MUCH: NEARLY EVERY DAY
4. FEELING TIRED OR HAVING LITTLE ENERGY: NEARLY EVERY DAY
5. POOR APPETITE OR OVEREATING: SEVERAL DAYS
9. THOUGHTS THAT YOU WOULD BE BETTER OFF DEAD, OR OF HURTING YOURSELF: NOT AT ALL
8. MOVING OR SPEAKING SO SLOWLY THAT OTHER PEOPLE COULD HAVE NOTICED. OR THE OPPOSITE, BEING SO FIGETY OR RESTLESS THAT YOU HAVE BEEN MOVING AROUND A LOT MORE THAN USUAL: SEVERAL DAYS
10. IF YOU CHECKED OFF ANY PROBLEMS, HOW DIFFICULT HAVE THESE PROBLEMS MADE IT FOR YOU TO DO YOUR WORK, TAKE CARE OF THINGS AT HOME, OR GET ALONG WITH OTHER PEOPLE: VERY DIFFICULT
2. FEELING DOWN, DEPRESSED OR HOPELESS: NEARLY EVERY DAY

## 2025-05-16 ASSESSMENT — ANXIETY QUESTIONNAIRES
3. WORRYING TOO MUCH ABOUT DIFFERENT THINGS: MORE THAN HALF THE DAYS
1. FEELING NERVOUS, ANXIOUS, OR ON EDGE: NEARLY EVERY DAY
5. BEING SO RESTLESS THAT IT IS HARD TO SIT STILL: SEVERAL DAYS
IF YOU CHECKED OFF ANY PROBLEMS ON THIS QUESTIONNAIRE, HOW DIFFICULT HAVE THESE PROBLEMS MADE IT FOR YOU TO DO YOUR WORK, TAKE CARE OF THINGS AT HOME, OR GET ALONG WITH OTHER PEOPLE: VERY DIFFICULT
6. BECOMING EASILY ANNOYED OR IRRITABLE: NEARLY EVERY DAY
GAD7 TOTAL SCORE: 13
4. TROUBLE RELAXING: SEVERAL DAYS
7. FEELING AFRAID AS IF SOMETHING AWFUL MIGHT HAPPEN: SEVERAL DAYS
2. NOT BEING ABLE TO STOP OR CONTROL WORRYING: MORE THAN HALF THE DAYS

## 2025-05-16 NOTE — PROGRESS NOTES
Subjective   Patient ID: Flaquita Mays is a 29 y.o. female who presents for Follow-up (Discuss increasing meds).  History of Present Illness  Flaquita Mays is a 29 year old female with depression who presents with worsening mood symptoms.  PHQ-9: 17  ROSA-7: 13    She is experiencing worsening mood symptoms despite being on Zoloft 25mg, which she previously found beneficial. She reports increased stress due to a difficult work environment and is actively seeking new employment. She feels unmotivated and has no desire to engage in activities after work, questioning whether this is due to work stress or depression. She notes increased irritability and tearfulness, though these have been longstanding issues. She feels the medication helps with irritability but is concerned it may not be as effective as before.    She reports sleep disturbances, including difficulty falling asleep and waking up, often going to bed late and waking up early, contributing to a cycle of poor sleep. She does not currently take any medication to aid sleep.    She is not currently in counseling due to financial constraints but has found it beneficial in the past. She has supportive friends. She has reduced her physical activity, noting a significant decrease in hiking compared to the previous year.    ED follow up 3/5/25  She experienced severe abdominal pain that led her to visit the emergency room. She initially attributed the pain to her menstrual cycle and the presence of a copper IUD. However, the pain was intense and debilitating. A CT scan was performed during her ER visit, and she was diagnosed with a urinary tract infection (UTI). She experienced no UTI symptoms such as burning and blood in the urine. This was her first UTI, and the symptoms have since resolved.      Review of Systems  ROS otherwise negative aside from what was mentioned above in HPI.    Objective     /73 (BP Location: Right arm, Patient Position: Sitting, BP  Cuff Size: Large adult)   Pulse 65   Temp 36.3 °C (97.4 °F) (Temporal)   Resp 14   Wt 99.9 kg (220 lb 4.8 oz)   LMP 04/07/2025 (Approximate)   SpO2 96%   BMI 36.66 kg/m²      Current Outpatient Medications   Medication Instructions    sertraline (ZOLOFT) 50 mg, oral, Daily     Physical Exam  Constitutional: Well developed, well nourished, alert and in no acute distress   Eyes: Normal external exam.   Cardiovascular: No peripheral edema.  Pulmonary: No respiratory distress  Skin: Warm, well perfused, normal skin turgor and color.   Neurologic: Cranial nerves II-XII grossly intact.   Psychiatric: Mood calm and affect normal.    Results  LABS  Urinalysis: Positive for UTI    RADIOLOGY  CT Abdomen: Normal    Assessment & Plan  Anxiety/Depression  Anxiety symptoms persist, potentially exacerbated by work-related stress. Current Sertraline 25 mg may be insufficient. She reports irritability, lack of motivation, and sleep disturbances, including difficulty falling asleep and waking up. No adverse effects from Sertraline reported. Discussed increasing the dose to 50 mg, within the typical therapeutic range, with potential for further increase if needed.  - Increase Sertraline to 50 mg daily.  - Consider over-the-counter sleep aids such as Unisom, sleepy time tea, magnesium oxide, glyceride, or gluconate 500mg, and melatonin 1 mg for sleep disturbances.  - Encourage regular exercise to improve mood and anxiety.  - Discuss the importance of social support and potential counseling resources at future employment.    Urinary tract infection  -resolved  -ED records, labs and CT reviewed       General Health Maintenance  Blood work is due as it has been a year since the last test. She is fasting today, so blood work will be completed.  - Complete blood work today as she is fasting.        Gela Mohr,      This medical note was created with the assistance of artificial intelligence (AI) for documentation purposes.  The content has been reviewed and confirmed by the healthcare provider for accuracy and completeness. Patient consented to the use of audio recording and use of AI during their visit.     Follow up in 6mo for CPE

## 2025-05-17 LAB
ALBUMIN SERPL-MCNC: 4.4 G/DL (ref 3.6–5.1)
ALP SERPL-CCNC: 79 U/L (ref 31–125)
ALT SERPL-CCNC: 28 U/L (ref 6–29)
ANION GAP SERPL CALCULATED.4IONS-SCNC: 10 MMOL/L (CALC) (ref 7–17)
AST SERPL-CCNC: 24 U/L (ref 10–30)
BILIRUB SERPL-MCNC: 0.5 MG/DL (ref 0.2–1.2)
BUN SERPL-MCNC: 10 MG/DL (ref 7–25)
CALCIUM SERPL-MCNC: 9.1 MG/DL (ref 8.6–10.2)
CHLORIDE SERPL-SCNC: 106 MMOL/L (ref 98–110)
CHOLEST SERPL-MCNC: 144 MG/DL
CHOLEST/HDLC SERPL: 3.1 (CALC)
CO2 SERPL-SCNC: 23 MMOL/L (ref 20–32)
CREAT SERPL-MCNC: 0.8 MG/DL (ref 0.5–0.97)
EGFRCR SERPLBLD CKD-EPI 2021: 102 ML/MIN/1.73M2
ERYTHROCYTE [DISTWIDTH] IN BLOOD BY AUTOMATED COUNT: 12.8 % (ref 11–15)
EST. AVERAGE GLUCOSE BLD GHB EST-MCNC: 111 MG/DL
EST. AVERAGE GLUCOSE BLD GHB EST-SCNC: 6.2 MMOL/L
GLUCOSE SERPL-MCNC: 90 MG/DL (ref 65–99)
HBA1C MFR BLD: 5.5 %
HCT VFR BLD AUTO: 41.5 % (ref 35–45)
HDLC SERPL-MCNC: 47 MG/DL
HGB BLD-MCNC: 13.3 G/DL (ref 11.7–15.5)
LDLC SERPL CALC-MCNC: 80 MG/DL (CALC)
MCH RBC QN AUTO: 29.2 PG (ref 27–33)
MCHC RBC AUTO-ENTMCNC: 32 G/DL (ref 32–36)
MCV RBC AUTO: 91.2 FL (ref 80–100)
NONHDLC SERPL-MCNC: 97 MG/DL (CALC)
PLATELET # BLD AUTO: 239 THOUSAND/UL (ref 140–400)
PMV BLD REES-ECKER: 11.4 FL (ref 7.5–12.5)
POTASSIUM SERPL-SCNC: 3.9 MMOL/L (ref 3.5–5.3)
PROT SERPL-MCNC: 7.7 G/DL (ref 6.1–8.1)
RBC # BLD AUTO: 4.55 MILLION/UL (ref 3.8–5.1)
SODIUM SERPL-SCNC: 139 MMOL/L (ref 135–146)
TRIGL SERPL-MCNC: 87 MG/DL
WBC # BLD AUTO: 6 THOUSAND/UL (ref 3.8–10.8)

## 2025-08-22 ENCOUNTER — APPOINTMENT (OUTPATIENT)
Dept: OBSTETRICS AND GYNECOLOGY | Facility: CLINIC | Age: 30
End: 2025-08-22
Payer: COMMERCIAL

## 2025-08-22 VITALS — WEIGHT: 227.2 LBS | SYSTOLIC BLOOD PRESSURE: 118 MMHG | DIASTOLIC BLOOD PRESSURE: 68 MMHG | BODY MASS INDEX: 37.81 KG/M2

## 2025-08-22 PROCEDURE — 90471 IMMUNIZATION ADMIN: CPT | Performed by: OBSTETRICS & GYNECOLOGY

## 2025-08-22 PROCEDURE — 90651 9VHPV VACCINE 2/3 DOSE IM: CPT | Performed by: OBSTETRICS & GYNECOLOGY

## 2025-11-04 ENCOUNTER — APPOINTMENT (OUTPATIENT)
Dept: PRIMARY CARE | Facility: CLINIC | Age: 30
End: 2025-11-04
Payer: COMMERCIAL

## 2026-01-29 ENCOUNTER — APPOINTMENT (OUTPATIENT)
Dept: OBSTETRICS AND GYNECOLOGY | Facility: CLINIC | Age: 31
End: 2026-01-29
Payer: COMMERCIAL